# Patient Record
Sex: MALE | Race: BLACK OR AFRICAN AMERICAN | Employment: FULL TIME | URBAN - METROPOLITAN AREA
[De-identification: names, ages, dates, MRNs, and addresses within clinical notes are randomized per-mention and may not be internally consistent; named-entity substitution may affect disease eponyms.]

---

## 2018-05-01 ENCOUNTER — ED HISTORICAL/CONVERTED ENCOUNTER (OUTPATIENT)
Dept: OTHER | Age: 43
End: 2018-05-01

## 2018-12-12 ENCOUNTER — ED HISTORICAL/CONVERTED ENCOUNTER (OUTPATIENT)
Dept: OTHER | Age: 43
End: 2018-12-12

## 2019-03-23 ENCOUNTER — ED HISTORICAL/CONVERTED ENCOUNTER (OUTPATIENT)
Dept: OTHER | Age: 44
End: 2019-03-23

## 2019-07-09 ENCOUNTER — ED HISTORICAL/CONVERTED ENCOUNTER (OUTPATIENT)
Dept: OTHER | Age: 44
End: 2019-07-09

## 2020-02-10 ENCOUNTER — ED HISTORICAL/CONVERTED ENCOUNTER (OUTPATIENT)
Dept: OTHER | Age: 45
End: 2020-02-10

## 2020-12-23 ENCOUNTER — HOSPITAL ENCOUNTER (EMERGENCY)
Age: 45
Discharge: HOME | End: 2020-12-23
Attending: EMERGENCY MEDICINE
Payer: COMMERCIAL

## 2020-12-23 VITALS
OXYGEN SATURATION: 99 % | DIASTOLIC BLOOD PRESSURE: 92 MMHG | HEIGHT: 77 IN | SYSTOLIC BLOOD PRESSURE: 121 MMHG | WEIGHT: 280 LBS | HEART RATE: 82 BPM | BODY MASS INDEX: 33.06 KG/M2 | RESPIRATION RATE: 18 BRPM | TEMPERATURE: 98.3 F

## 2020-12-23 DIAGNOSIS — K52.9 GASTROENTERITIS: Primary | ICD-10-CM

## 2020-12-23 LAB
ALBUMIN SERPL-MCNC: 3.8 G/DL (ref 3.5–5)
ALBUMIN/GLOB SERPL: 0.9 {RATIO} (ref 1.1–2.2)
ALP SERPL-CCNC: 59 U/L (ref 45–117)
ALT SERPL-CCNC: 28 U/L (ref 12–78)
ANION GAP SERPL CALC-SCNC: 6 MMOL/L (ref 5–15)
AST SERPL W P-5'-P-CCNC: 12 U/L (ref 15–37)
BASOPHILS # BLD: 0 K/UL (ref 0–0.1)
BASOPHILS NFR BLD: 0 % (ref 0–1)
BILIRUB SERPL-MCNC: 0.4 MG/DL (ref 0.2–1)
BUN SERPL-MCNC: 12 MG/DL (ref 6–20)
BUN/CREAT SERPL: 10 (ref 12–20)
CA-I BLD-MCNC: 8.8 MG/DL (ref 8.5–10.1)
CHLORIDE SERPL-SCNC: 103 MMOL/L (ref 97–108)
CO2 SERPL-SCNC: 30 MMOL/L (ref 21–32)
CREAT SERPL-MCNC: 1.17 MG/DL (ref 0.7–1.3)
DIFFERENTIAL METHOD BLD: NORMAL
EOSINOPHIL # BLD: 0.3 K/UL (ref 0–0.4)
EOSINOPHIL NFR BLD: 5 % (ref 0–7)
ERYTHROCYTE [DISTWIDTH] IN BLOOD BY AUTOMATED COUNT: 13.2 % (ref 11.5–14.5)
GLOBULIN SER CALC-MCNC: 4.2 G/DL (ref 2–4)
GLUCOSE SERPL-MCNC: 106 MG/DL (ref 65–100)
HCT VFR BLD AUTO: 44.3 % (ref 36.6–50.3)
HGB BLD-MCNC: 14.5 G/DL (ref 12.1–17)
IMM GRANULOCYTES # BLD AUTO: 0 K/UL (ref 0–0.04)
IMM GRANULOCYTES NFR BLD AUTO: 0 % (ref 0–0.5)
LIPASE SERPL-CCNC: 89 U/L (ref 73–393)
LYMPHOCYTES # BLD: 2.4 K/UL (ref 0.8–3.5)
LYMPHOCYTES NFR BLD: 41 % (ref 12–49)
MCH RBC QN AUTO: 28.3 PG (ref 26–34)
MCHC RBC AUTO-ENTMCNC: 32.7 G/DL (ref 30–36.5)
MCV RBC AUTO: 86.5 FL (ref 80–99)
MONOCYTES # BLD: 0.4 K/UL (ref 0–1)
MONOCYTES NFR BLD: 8 % (ref 5–13)
NEUTS SEG # BLD: 2.7 K/UL (ref 1.8–8)
NEUTS SEG NFR BLD: 46 % (ref 32–75)
PLATELET # BLD AUTO: 291 K/UL (ref 150–400)
PMV BLD AUTO: 9.2 FL (ref 8.9–12.9)
POTASSIUM SERPL-SCNC: 4 MMOL/L (ref 3.5–5.1)
PROT SERPL-MCNC: 8 G/DL (ref 6.4–8.2)
RBC # BLD AUTO: 5.12 M/UL (ref 4.1–5.7)
SODIUM SERPL-SCNC: 139 MMOL/L (ref 136–145)
WBC # BLD AUTO: 5.8 K/UL (ref 4.1–11.1)

## 2020-12-23 PROCEDURE — 96374 THER/PROPH/DIAG INJ IV PUSH: CPT

## 2020-12-23 PROCEDURE — 99282 EMERGENCY DEPT VISIT SF MDM: CPT

## 2020-12-23 PROCEDURE — 36415 COLL VENOUS BLD VENIPUNCTURE: CPT

## 2020-12-23 PROCEDURE — 85025 COMPLETE CBC W/AUTO DIFF WBC: CPT

## 2020-12-23 PROCEDURE — 80053 COMPREHEN METABOLIC PANEL: CPT

## 2020-12-23 PROCEDURE — 74011250636 HC RX REV CODE- 250/636: Performed by: EMERGENCY MEDICINE

## 2020-12-23 PROCEDURE — 83690 ASSAY OF LIPASE: CPT

## 2020-12-23 RX ORDER — ONDANSETRON 2 MG/ML
4 INJECTION INTRAMUSCULAR; INTRAVENOUS
Status: COMPLETED | OUTPATIENT
Start: 2020-12-23 | End: 2020-12-23

## 2020-12-23 RX ORDER — ONDANSETRON 4 MG/1
4 TABLET, ORALLY DISINTEGRATING ORAL
Qty: 20 TAB | Refills: 0 | Status: SHIPPED | OUTPATIENT
Start: 2020-12-23

## 2020-12-23 RX ADMIN — ONDANSETRON 4 MG: 2 INJECTION INTRAMUSCULAR; INTRAVENOUS at 12:50

## 2020-12-23 NOTE — ED PROVIDER NOTES
EMERGENCY DEPARTMENT HISTORY AND PHYSICAL EXAM      Date: 12/23/2020  Patient Name: Lajuana Boeck      History of Presenting Illness     Chief Complaint   Patient presents with    Vomiting    Diarrhea       History Provided By: Patient    HPI: Lajuana Boeck, 39 y.o. male with a past medical history significant No significant past medical history presents to the ED with cc of NVD 1 month. Started after Thanskgivings    There are no other complaints, changes, or physical findings at this time. PCP: Aimee Hess DO        Past History     Past Medical History:  No past medical history on file. Past Surgical History:  Past Surgical History:   Procedure Laterality Date    HX OTHER SURGICAL      hernia        Family History:  No family history on file. Social History:  Social History     Tobacco Use    Smoking status: Never Smoker    Smokeless tobacco: Never Used   Substance Use Topics    Alcohol use: Yes     Comment: occasions    Drug use: No       Allergies: Allergies   Allergen Reactions    Amoxicillin Rash         Review of Systems     Review of Systems   Constitutional: Negative. HENT: Negative. Cardiovascular: Negative. Gastrointestinal: Positive for diarrhea, nausea and vomiting. Genitourinary: Negative. Musculoskeletal: Negative. Skin: Negative. Neurological: Negative. All other systems reviewed and are negative. Physical Exam     Physical Exam  Vitals signs and nursing note reviewed. Constitutional:       Appearance: Normal appearance. HENT:      Head: Normocephalic and atraumatic. Neck:      Musculoskeletal: Normal range of motion and neck supple. Cardiovascular:      Rate and Rhythm: Normal rate and regular rhythm. Pulses: Normal pulses. Heart sounds: Normal heart sounds. Pulmonary:      Effort: Pulmonary effort is normal.      Breath sounds: Normal breath sounds. Abdominal:      General: Abdomen is flat.       Palpations: Abdomen is soft. Musculoskeletal: Normal range of motion. Neurological:      General: No focal deficit present. Mental Status: He is alert and oriented to person, place, and time. Lab and Diagnostic Study Results     Labs -     Recent Results (from the past 12 hour(s))   CBC WITH AUTOMATED DIFF    Collection Time: 12/23/20 12:45 PM   Result Value Ref Range    WBC 5.8 4.1 - 11.1 K/uL    RBC 5.12 4.10 - 5.70 M/uL    HGB 14.5 12.1 - 17.0 g/dL    HCT 44.3 36.6 - 50.3 %    MCV 86.5 80.0 - 99.0 FL    MCH 28.3 26.0 - 34.0 PG    MCHC 32.7 30.0 - 36.5 g/dL    RDW 13.2 11.5 - 14.5 %    PLATELET 383 258 - 537 K/uL    MPV 9.2 8.9 - 12.9 FL    NEUTROPHILS 46 32 - 75 %    LYMPHOCYTES 41 12 - 49 %    MONOCYTES 8 5 - 13 %    EOSINOPHILS 5 0 - 7 %    BASOPHILS 0 0 - 1 %    IMMATURE GRANULOCYTES 0 0.0 - 0.5 %    ABS. NEUTROPHILS 2.7 1.8 - 8.0 K/UL    ABS. LYMPHOCYTES 2.4 0.8 - 3.5 K/UL    ABS. MONOCYTES 0.4 0.0 - 1.0 K/UL    ABS. EOSINOPHILS 0.3 0.0 - 0.4 K/UL    ABS. BASOPHILS 0.0 0.0 - 0.1 K/UL    ABS. IMM. GRANS. 0.0 0.00 - 0.04 K/UL    DF AUTOMATED     METABOLIC PANEL, COMPREHENSIVE    Collection Time: 12/23/20 12:45 PM   Result Value Ref Range    Sodium 139 136 - 145 mmol/L    Potassium 4.0 3.5 - 5.1 mmol/L    Chloride 103 97 - 108 mmol/L    CO2 30 21 - 32 mmol/L    Anion gap 6 5 - 15 mmol/L    Glucose 106 (H) 65 - 100 mg/dL    BUN 12 6 - 20 mg/dL    Creatinine 1.17 0.70 - 1.30 mg/dL    BUN/Creatinine ratio 10 (L) 12 - 20      GFR est AA >60 >60 ml/min/1.73m2    GFR est non-AA >60 >60 ml/min/1.73m2    Calcium 8.8 8.5 - 10.1 mg/dL    Bilirubin, total 0.4 0.2 - 1.0 mg/dL    AST (SGOT) 12 (L) 15 - 37 U/L    ALT (SGPT) 28 12 - 78 U/L    Alk.  phosphatase 59 45 - 117 U/L    Protein, total 8.0 6.4 - 8.2 g/dL    Albumin 3.8 3.5 - 5.0 g/dL    Globulin 4.2 (H) 2.0 - 4.0 g/dL    A-G Ratio 0.9 (L) 1.1 - 2.2     LIPASE    Collection Time: 12/23/20 12:45 PM   Result Value Ref Range    Lipase 89 73 - 393 U/L       Radiologic Studies - [unfilled]  CT Results  (Last 48 hours)    None        CXR Results  (Last 48 hours)    None          Medical Decision Making and ED Course   - I am the first and primary provider for this patient AND AM THE PRIMARY PROVIDER OF RECORD. - I reviewed the vital signs, available nursing notes, past medical history, past surgical history, family history and social history. - Initial assessment performed. The patients presenting problems have been discussed, and the staff are in agreement with the care plan formulated and outlined with them. I have encouraged them to ask questions as they arise throughout their visit. Vital Signs-Reviewed the patient's vital signs. Patient Vitals for the past 12 hrs:   Temp Pulse Resp BP SpO2   12/23/20 1249  82  (!) 121/92    12/23/20 1247  84  (!) 126/94    12/23/20 1245  89  127/84    12/23/20 1233 98.3 °F (36.8 °C) 90 18 (!) 134/93 99 %           Records Reviewed: Nursing Notes    The patient presents with     ED Course:       ED Course as of Dec 23 1406   Wed Dec 23, 2020   1252 BP(!): 121/92 [EO]      ED Course User Index  [EO] Tierra Paredes MD         Provider Notes (Medical Decision Making): MDM           Consultations:       Consultations:         Procedures and Critical Care       Performed by: Akosua Gr MD  PROCEDURES:  Procedures               C    Disposition     Disposition: DC- Adult Discharges: All of the diagnostic tests were reviewed and questions answered. Diagnosis, care plan and treatment options were discussed. The patient understands the instructions and will follow up as directed. The patients results have been reviewed with them. They have been counseled regarding their diagnosis. The patient verbally convey understanding and agreement of the signs, symptoms, diagnosis, treatment and prognosis and additionally agrees to follow up as recommended with their PCP in 24 - 48 hours.   They also agree with the care-plan and convey that all of their questions have been answered. I have also put together some discharge instructions for them that include: 1) educational information regarding their diagnosis, 2) how to care for their diagnosis at home, as well a 3) list of reasons why they would want to return to the ED prior to their follow-up appointment, should their condition change. Discharged    Remove if not discharged  DISCHARGE PLAN:  1. There are no discharge medications for this patient. 2.   Follow-up Information     Follow up With Specialties Details Why [facility], [address] [place] [address], DO Family Medicine In 3 days  [address]  [address] [place] [address]  [phone]          3. Return to ED if worse   4. Current Discharge Medication List      START taking these medications    Details   ondansetron (Zofran ODT) 4 mg disintegrating tablet 1 Tab by SubLINGual route every eight (8) hours as needed for Nausea or Vomiting. Qty: 20 Tab, Refills: 0             Diagnosis     Clinical Impression:   1. Gastroenteritis        Attestations:    Catalino Du MD    Please note that this dictation was completed with [employer], the computer voice recognition software. Quite often unanticipated grammatical, syntax, homophones, and other interpretive errors are inadvertently transcribed by the computer software. Please disregard these errors. Please excuse any errors that have escaped final proofreading. Thank you.

## 2020-12-23 NOTE — LETTER
[address] [facility] EMERGENCY DEPARTMENT  [address] [facility] [address]  [phone]    Work/School Note    Date: 12/23/2020    To Whom It May concern:      Lluvia Rios was seen and treated today in the emergency room by the following provider(s):  Attending Provider: Catalino Hodgson MD.      Lluvia Rios is excused from work/school on 12/23/20. He is clear to return to work/school on 12/24/20.         Sincerely,          Yulissa Jordan

## 2023-05-16 RX ORDER — ONDANSETRON 4 MG/1
TABLET, ORALLY DISINTEGRATING ORAL EVERY 8 HOURS PRN
COMMUNITY
Start: 2020-12-23

## 2023-08-10 ENCOUNTER — HOSPITAL ENCOUNTER (EMERGENCY)
Facility: HOSPITAL | Age: 48
Discharge: HOME | End: 2023-08-11
Attending: STUDENT IN AN ORGANIZED HEALTH CARE EDUCATION/TRAINING PROGRAM
Payer: COMMERCIAL

## 2023-08-10 DIAGNOSIS — I10 PRIMARY HYPERTENSION: Primary | ICD-10-CM

## 2023-08-10 DIAGNOSIS — R51.9 NONINTRACTABLE HEADACHE, UNSPECIFIED CHRONICITY PATTERN, UNSPECIFIED HEADACHE TYPE: ICD-10-CM

## 2023-08-10 PROCEDURE — 93005 ELECTROCARDIOGRAM TRACING: CPT | Performed by: STUDENT IN AN ORGANIZED HEALTH CARE EDUCATION/TRAINING PROGRAM

## 2023-08-10 PROCEDURE — 99284 EMERGENCY DEPT VISIT MOD MDM: CPT

## 2023-08-11 ENCOUNTER — APPOINTMENT (OUTPATIENT)
Facility: HOSPITAL | Age: 48
End: 2023-08-11
Payer: COMMERCIAL

## 2023-08-11 VITALS
BODY MASS INDEX: 34.24 KG/M2 | OXYGEN SATURATION: 99 % | RESPIRATION RATE: 18 BRPM | WEIGHT: 290 LBS | DIASTOLIC BLOOD PRESSURE: 96 MMHG | TEMPERATURE: 99 F | HEIGHT: 77 IN | HEART RATE: 68 BPM | SYSTOLIC BLOOD PRESSURE: 159 MMHG

## 2023-08-11 LAB
EKG ATRIAL RATE: 76 BPM
EKG DIAGNOSIS: NORMAL
EKG P AXIS: 51 DEGREES
EKG P-R INTERVAL: 148 MS
EKG Q-T INTERVAL: 368 MS
EKG QRS DURATION: 94 MS
EKG QTC CALCULATION (BAZETT): 414 MS
EKG R AXIS: -1 DEGREES
EKG T AXIS: 45 DEGREES
EKG VENTRICULAR RATE: 76 BPM

## 2023-08-11 PROCEDURE — 6370000000 HC RX 637 (ALT 250 FOR IP): Performed by: STUDENT IN AN ORGANIZED HEALTH CARE EDUCATION/TRAINING PROGRAM

## 2023-08-11 PROCEDURE — 70450 CT HEAD/BRAIN W/O DYE: CPT

## 2023-08-11 RX ORDER — IBUPROFEN 600 MG/1
600 TABLET ORAL
Status: DISCONTINUED | OUTPATIENT
Start: 2023-08-11 | End: 2023-08-11

## 2023-08-11 RX ORDER — IBUPROFEN 800 MG/1
800 TABLET ORAL
Status: COMPLETED | OUTPATIENT
Start: 2023-08-11 | End: 2023-08-11

## 2023-08-11 RX ADMIN — IBUPROFEN 800 MG: 800 TABLET, FILM COATED ORAL at 01:10

## 2023-08-11 NOTE — ED PROVIDER NOTES
[facility] EMERGENCY DEPT  EMERGENCY DEPARTMENT HISTORY AND PHYSICAL EXAM      Date: 8/10/2023  Patient Name: Ronit Gomez  MRN: [id number]  YOB: 1975  Date of evaluation: 8/10/2023  Provider: Maryuri Cabral MD   Note Started: 12:54 AM EDT 8/11/23    HISTORY OF PRESENT ILLNESS     Chief Complaint   Patient presents with    Hypertension    Headache    Shortness of Breath       History Provided By: Patient    HPI: Ronit Gomez is a 52 y.o. male presents to the emergency department for headache concerned about high blood pressure. Patient states that over the last several days he has noticed his blood pressure to be moderately elevated, today noticed a headache which has slowly increased in severity described as aching right-sided pain. Patient states he took his lisinopril this morning. Patient admits to being minimally compliant with his medications, did check his blood pressure at home said it was 240/120. Denies any neck pain or stiffness denies any blurry vision double vision, no nausea vomiting. PAST MEDICAL HISTORY   Past Medical History:  Past Medical History:   Diagnosis Date    Diabetes mellitus ([address])     Hypertension        Past Surgical History:  Past Surgical History:   Procedure Laterality Date    OTHER SURGICAL HISTORY      hernia        Family History:  History reviewed. No pertinent family history. Social History:  Social History     Tobacco Use    Smoking status: Never    Smokeless tobacco: Never   Substance Use Topics    Alcohol use: Yes    Drug use: No       Allergies:   Allergies   Allergen Reactions    Amoxicillin Rash       PCP: Skylar Rice DO    Current Meds:   Current Facility-Administered Medications   Medication Dose Route Frequency Provider Last Rate Last Admin    ibuprofen (ADVIL;MOTRIN) tablet 800 mg  800 mg Oral NOW Darlene Parker MD         Current Outpatient Medications   Medication Sig Dispense Refill    ondansetron (ZOFRAN-ODT) 4 MG disintegrating tablet Place

## 2023-09-07 ENCOUNTER — HOSPITAL ENCOUNTER (EMERGENCY)
Facility: HOSPITAL | Age: 48
Discharge: HOME | End: 2023-09-07
Attending: STUDENT IN AN ORGANIZED HEALTH CARE EDUCATION/TRAINING PROGRAM
Payer: COMMERCIAL

## 2023-09-07 VITALS
OXYGEN SATURATION: 96 % | TEMPERATURE: 98 F | HEIGHT: 77 IN | BODY MASS INDEX: 34.95 KG/M2 | RESPIRATION RATE: 14 BRPM | HEART RATE: 88 BPM | SYSTOLIC BLOOD PRESSURE: 119 MMHG | WEIGHT: 296 LBS | DIASTOLIC BLOOD PRESSURE: 84 MMHG

## 2023-09-07 DIAGNOSIS — R79.89 ELEVATED SERUM CREATININE: ICD-10-CM

## 2023-09-07 DIAGNOSIS — E86.0 DEHYDRATION: ICD-10-CM

## 2023-09-07 DIAGNOSIS — R42 LIGHTHEADEDNESS: Primary | ICD-10-CM

## 2023-09-07 LAB
ALBUMIN SERPL-MCNC: 4.4 G/DL (ref 3.5–5)
ALBUMIN/GLOB SERPL: 1.2 (ref 1.1–2.2)
ALP SERPL-CCNC: 36 U/L (ref 45–117)
ALT SERPL-CCNC: 25 U/L (ref 12–78)
ANION GAP SERPL CALC-SCNC: 10 MMOL/L (ref 5–15)
AST SERPL W P-5'-P-CCNC: 9 U/L (ref 15–37)
BASOPHILS # BLD: 0 K/UL (ref 0–0.1)
BASOPHILS NFR BLD: 0 % (ref 0–1)
BILIRUB SERPL-MCNC: 0.4 MG/DL (ref 0.2–1)
BNP SERPL-MCNC: 5 PG/ML
BUN SERPL-MCNC: 36 MG/DL (ref 6–20)
BUN/CREAT SERPL: 15 (ref 12–20)
CA-I BLD-MCNC: 9.3 MG/DL (ref 8.5–10.1)
CHLORIDE SERPL-SCNC: 104 MMOL/L (ref 97–108)
CK SERPL-CCNC: 108 U/L (ref 39–308)
CO2 SERPL-SCNC: 24 MMOL/L (ref 21–32)
CREAT SERPL-MCNC: 2.44 MG/DL (ref 0.7–1.3)
DIFFERENTIAL METHOD BLD: NORMAL
EOSINOPHIL # BLD: 0.1 K/UL (ref 0–0.4)
EOSINOPHIL NFR BLD: 2 % (ref 0–7)
ERYTHROCYTE [DISTWIDTH] IN BLOOD BY AUTOMATED COUNT: 13.7 % (ref 11.5–14.5)
GLOBULIN SER CALC-MCNC: 3.8 G/DL (ref 2–4)
GLUCOSE SERPL-MCNC: 150 MG/DL (ref 65–100)
HCT VFR BLD AUTO: 44.7 % (ref 36.6–50.3)
HGB BLD-MCNC: 15 G/DL (ref 12.1–17)
IMM GRANULOCYTES # BLD AUTO: 0 K/UL (ref 0–0.04)
IMM GRANULOCYTES NFR BLD AUTO: 0 % (ref 0–0.5)
LYMPHOCYTES # BLD: 2.3 K/UL (ref 0.8–3.5)
LYMPHOCYTES NFR BLD: 34 % (ref 12–49)
MCH RBC QN AUTO: 28.1 PG (ref 26–34)
MCHC RBC AUTO-ENTMCNC: 33.6 G/DL (ref 30–36.5)
MCV RBC AUTO: 83.9 FL (ref 80–99)
MONOCYTES # BLD: 0.5 K/UL (ref 0–1)
MONOCYTES NFR BLD: 7 % (ref 5–13)
NEUTS SEG # BLD: 3.9 K/UL (ref 1.8–8)
NEUTS SEG NFR BLD: 57 % (ref 32–75)
NRBC # BLD: 0 K/UL (ref 0–0.01)
NRBC BLD-RTO: 0 PER 100 WBC
PLATELET # BLD AUTO: 280 K/UL (ref 150–400)
PMV BLD AUTO: 9.2 FL (ref 8.9–12.9)
POTASSIUM SERPL-SCNC: 4 MMOL/L (ref 3.5–5.1)
PROT SERPL-MCNC: 8.2 G/DL (ref 6.4–8.2)
RBC # BLD AUTO: 5.33 M/UL (ref 4.1–5.7)
SODIUM SERPL-SCNC: 138 MMOL/L (ref 136–145)
TROPONIN I SERPL HS-MCNC: 4 NG/L (ref 0–76)
WBC # BLD AUTO: 6.8 K/UL (ref 4.1–11.1)

## 2023-09-07 PROCEDURE — 83880 ASSAY OF NATRIURETIC PEPTIDE: CPT

## 2023-09-07 PROCEDURE — 84484 ASSAY OF TROPONIN QUANT: CPT

## 2023-09-07 PROCEDURE — 96360 HYDRATION IV INFUSION INIT: CPT

## 2023-09-07 PROCEDURE — 96361 HYDRATE IV INFUSION ADD-ON: CPT

## 2023-09-07 PROCEDURE — 2580000003 HC RX 258: Performed by: STUDENT IN AN ORGANIZED HEALTH CARE EDUCATION/TRAINING PROGRAM

## 2023-09-07 PROCEDURE — 2580000003 HC RX 258: Performed by: EMERGENCY MEDICINE

## 2023-09-07 PROCEDURE — 36415 COLL VENOUS BLD VENIPUNCTURE: CPT

## 2023-09-07 PROCEDURE — 80053 COMPREHEN METABOLIC PANEL: CPT

## 2023-09-07 PROCEDURE — 93005 ELECTROCARDIOGRAM TRACING: CPT | Performed by: EMERGENCY MEDICINE

## 2023-09-07 PROCEDURE — 99284 EMERGENCY DEPT VISIT MOD MDM: CPT

## 2023-09-07 PROCEDURE — 85025 COMPLETE CBC W/AUTO DIFF WBC: CPT

## 2023-09-07 PROCEDURE — 82550 ASSAY OF CK (CPK): CPT

## 2023-09-07 RX ORDER — 0.9 % SODIUM CHLORIDE 0.9 %
1000 INTRAVENOUS SOLUTION INTRAVENOUS ONCE
Status: COMPLETED | OUTPATIENT
Start: 2023-09-07 | End: 2023-09-07

## 2023-09-07 RX ADMIN — SODIUM CHLORIDE 1000 ML: 9 INJECTION, SOLUTION INTRAVENOUS at 14:08

## 2023-09-07 RX ADMIN — SODIUM CHLORIDE 1000 ML: 9 INJECTION, SOLUTION INTRAVENOUS at 15:23

## 2023-09-07 ASSESSMENT — LIFESTYLE VARIABLES
HOW MANY STANDARD DRINKS CONTAINING ALCOHOL DO YOU HAVE ON A TYPICAL DAY: PATIENT DOES NOT DRINK
HOW OFTEN DO YOU HAVE A DRINK CONTAINING ALCOHOL: NEVER

## 2023-09-07 ASSESSMENT — PAIN - FUNCTIONAL ASSESSMENT: PAIN_FUNCTIONAL_ASSESSMENT: NONE - DENIES PAIN

## 2023-09-07 NOTE — ED PROVIDER NOTES
in the setting of his dehydration, but that it needs to be rechecked regardless. Strict return precautions discussed and advised him to follow-up with his PCP today or tomorrow. He agrees with plan. Safe for discharge. [BD]      ED Course User Index  [BD] Nelly Bailey MD       Disposition Considerations (Tests not done, Shared Decision Making, Pt Expectation of Test or Treatment.): See ED Course    Patient was given the following medications:  Medications   sodium chloride 0.9 % bolus 1,000 mL (has no administration in time range)       CONSULTS: (Who and What was discussed)  None     Social Determinants affecting Dx or Tx: None    Smoking Cessation: Not Applicable    PROCEDURES   Unless otherwise noted above, none  Procedures      CRITICAL CARE TIME   CRITICAL CARE NOTE :    11:57 PM    IMPENDING DETERIORATION -Cardiovascular  ASSOCIATED RISK FACTORS - Hypotension and Dehydration  MANAGEMENT- Bedside Assessment and Supervision of Care  INTERPRETATION -  ECG and Blood Pressure  INTERVENTIONS - hemodynamic mgmt  CASE REVIEW - Family  TREATMENT RESPONSE -Improved  PERFORMED BY - Self    NOTES   :  I have spent 25 minutes of critical care time involved in lab review, consultations with specialist, family decision- making, bedside attention and documentation. This time excludes time spent in any separate billed procedures. During this entire length of time I was immediately available to the patient . Nelly Bailey MD    ED FINAL IMPRESSION   No diagnosis found. DISPOSITION/PLAN   DISPOSITION      Discharge Note: The patient is stable for discharge home. The signs, symptoms, diagnosis, and discharge instructions have been discussed, understanding conveyed, and agreed upon. The patient is to follow up as recommended or return to ER should their symptoms worsen. PATIENT REFERRED TO:  No follow-up provider specified.       DISCHARGE MEDICATIONS:     Medication List        ASK your doctor about

## 2023-09-07 NOTE — DISCHARGE INSTRUCTIONS
We strongly advise you to follow-up with your primary care provider and discuss your elevated creatinine with them. Thank you! Thank you for allowing me to care for you in the emergency department. It is my goal to provide you with excellent care. If you have not received excellent quality care, please ask to speak to the nurse manager. Please fill out the survey that will come to you by mail or email since we listen to your feedback! Below you will find a list of your tests from today's visit. Should you have any questions, please do not hesitate to call the emergency department.     Labs  Recent Results (from the past 12 hour(s))   CBC with Auto Differential    Collection Time: 09/07/23  2:03 PM   Result Value Ref Range    WBC 6.8 4.1 - 11.1 K/uL    RBC 5.33 4.10 - 5.70 M/uL    Hemoglobin 15.0 12.1 - 17.0 g/dL    Hematocrit 44.7 36.6 - 50.3 %    MCV 83.9 80.0 - 99.0 FL    MCH 28.1 26.0 - 34.0 PG    MCHC 33.6 30.0 - 36.5 g/dL    RDW 13.7 11.5 - 14.5 %    Platelets 441 446 - 999 K/uL    MPV 9.2 8.9 - 12.9 FL    Nucleated RBCs 0.0 0.0  WBC    nRBC 0.00 0.00 - 0.01 K/uL    Neutrophils % 57 32 - 75 %    Lymphocytes % 34 12 - 49 %    Monocytes % 7 5 - 13 %    Eosinophils % 2 0 - 7 %    Basophils % 0 0 - 1 %    Immature Granulocytes 0 0 - 0.5 %    Neutrophils Absolute 3.9 1.8 - 8.0 K/UL    Lymphocytes Absolute 2.3 0.8 - 3.5 K/UL    Monocytes Absolute 0.5 0.0 - 1.0 K/UL    Eosinophils Absolute 0.1 0.0 - 0.4 K/UL    Basophils Absolute 0.0 0.0 - 0.1 K/UL    Absolute Immature Granulocyte 0.0 0.00 - 0.04 K/UL    Differential Type AUTOMATED     Comprehensive Metabolic Panel    Collection Time: 09/07/23  2:03 PM   Result Value Ref Range    Sodium 138 136 - 145 mmol/L    Potassium 4.0 3.5 - 5.1 mmol/L    Chloride 104 97 - 108 mmol/L    CO2 24 21 - 32 mmol/L    Anion Gap 10 5 - 15 mmol/L    Glucose 150 (H) 65 - 100 mg/dL    BUN 36 (H) 6 - 20 mg/dL    Creatinine 2.44 (H) 0.70 - 1.30 mg/dL    Bun/Cre Ratio

## 2023-09-08 LAB
EKG ATRIAL RATE: 79 BPM
EKG DIAGNOSIS: NORMAL
EKG P AXIS: 49 DEGREES
EKG P-R INTERVAL: 142 MS
EKG Q-T INTERVAL: 372 MS
EKG QRS DURATION: 96 MS
EKG QTC CALCULATION (BAZETT): 426 MS
EKG R AXIS: 31 DEGREES
EKG T AXIS: 16 DEGREES
EKG VENTRICULAR RATE: 79 BPM

## 2023-09-20 ENCOUNTER — HOSPITAL ENCOUNTER (EMERGENCY)
Facility: HOSPITAL | Age: 48
Discharge: HOME | End: 2023-09-20
Payer: COMMERCIAL

## 2023-09-20 VITALS
OXYGEN SATURATION: 96 % | DIASTOLIC BLOOD PRESSURE: 98 MMHG | BODY MASS INDEX: 33.06 KG/M2 | RESPIRATION RATE: 18 BRPM | SYSTOLIC BLOOD PRESSURE: 152 MMHG | HEART RATE: 69 BPM | TEMPERATURE: 98.5 F | WEIGHT: 280 LBS | HEIGHT: 77 IN

## 2023-09-20 DIAGNOSIS — M54.6 ACUTE BILATERAL THORACIC BACK PAIN: Primary | ICD-10-CM

## 2023-09-20 LAB
ALBUMIN SERPL-MCNC: 3.9 G/DL (ref 3.5–5)
ALBUMIN/GLOB SERPL: 1.1 (ref 1.1–2.2)
ALP SERPL-CCNC: 46 U/L (ref 45–117)
ALT SERPL-CCNC: 29 U/L (ref 12–78)
ANION GAP SERPL CALC-SCNC: 10 MMOL/L (ref 5–15)
APPEARANCE UR: CLEAR
AST SERPL W P-5'-P-CCNC: 22 U/L (ref 15–37)
BACTERIA URNS QL MICRO: NEGATIVE /HPF
BASOPHILS # BLD: 0 K/UL (ref 0–0.1)
BASOPHILS NFR BLD: 0 % (ref 0–1)
BILIRUB SERPL-MCNC: 0.3 MG/DL (ref 0.2–1)
BILIRUB UR QL: NEGATIVE
BUN SERPL-MCNC: 16 MG/DL (ref 6–20)
BUN/CREAT SERPL: 16 (ref 12–20)
CA-I BLD-MCNC: 9.2 MG/DL (ref 8.5–10.1)
CHLORIDE SERPL-SCNC: 106 MMOL/L (ref 97–108)
CO2 SERPL-SCNC: 25 MMOL/L (ref 21–32)
COLOR UR: ABNORMAL
CREAT SERPL-MCNC: 1.02 MG/DL (ref 0.7–1.3)
DIFFERENTIAL METHOD BLD: NORMAL
EOSINOPHIL # BLD: 0.1 K/UL (ref 0–0.4)
EOSINOPHIL NFR BLD: 2 % (ref 0–7)
EPITH CASTS URNS QL MICRO: ABNORMAL /LPF
ERYTHROCYTE [DISTWIDTH] IN BLOOD BY AUTOMATED COUNT: 14.3 % (ref 11.5–14.5)
GLOBULIN SER CALC-MCNC: 3.5 G/DL (ref 2–4)
GLUCOSE SERPL-MCNC: 127 MG/DL (ref 65–100)
GLUCOSE UR STRIP.AUTO-MCNC: 150 MG/DL
HCT VFR BLD AUTO: 39.2 % (ref 36.6–50.3)
HGB BLD-MCNC: 12.8 G/DL (ref 12.1–17)
HGB UR QL STRIP: NEGATIVE
IMM GRANULOCYTES # BLD AUTO: 0 K/UL (ref 0–0.04)
IMM GRANULOCYTES NFR BLD AUTO: 0 % (ref 0–0.5)
KETONES UR QL STRIP.AUTO: NEGATIVE MG/DL
LEUKOCYTE ESTERASE UR QL STRIP.AUTO: NEGATIVE
LYMPHOCYTES # BLD: 2.9 K/UL (ref 0.8–3.5)
LYMPHOCYTES NFR BLD: 43 % (ref 12–49)
MCH RBC QN AUTO: 28.2 PG (ref 26–34)
MCHC RBC AUTO-ENTMCNC: 32.7 G/DL (ref 30–36.5)
MCV RBC AUTO: 86.3 FL (ref 80–99)
MONOCYTES # BLD: 0.5 K/UL (ref 0–1)
MONOCYTES NFR BLD: 7 % (ref 5–13)
MUCOUS THREADS URNS QL MICRO: ABNORMAL /LPF
NEUTS SEG # BLD: 3.1 K/UL (ref 1.8–8)
NEUTS SEG NFR BLD: 48 % (ref 32–75)
NITRITE UR QL STRIP.AUTO: NEGATIVE
NRBC # BLD: 0 K/UL (ref 0–0.01)
NRBC BLD-RTO: 0 PER 100 WBC
PH UR STRIP: 5 (ref 5–8)
PLATELET # BLD AUTO: 242 K/UL (ref 150–400)
PMV BLD AUTO: 9.7 FL (ref 8.9–12.9)
POTASSIUM SERPL-SCNC: 4.9 MMOL/L (ref 3.5–5.1)
PROT SERPL-MCNC: 7.4 G/DL (ref 6.4–8.2)
PROT UR STRIP-MCNC: NEGATIVE MG/DL
RBC # BLD AUTO: 4.54 M/UL (ref 4.1–5.7)
RBC #/AREA URNS HPF: ABNORMAL /HPF (ref 0–5)
SODIUM SERPL-SCNC: 141 MMOL/L (ref 136–145)
SP GR UR REFRACTOMETRY: 1.02 (ref 1–1.03)
URINE CULTURE IF INDICATED: ABNORMAL
UROBILINOGEN UR QL STRIP.AUTO: 0.1 EU/DL (ref 0.1–1)
WBC # BLD AUTO: 6.7 K/UL (ref 4.1–11.1)
WBC URNS QL MICRO: ABNORMAL /HPF (ref 0–4)

## 2023-09-20 PROCEDURE — 6360000002 HC RX W HCPCS

## 2023-09-20 PROCEDURE — 36415 COLL VENOUS BLD VENIPUNCTURE: CPT

## 2023-09-20 PROCEDURE — 99284 EMERGENCY DEPT VISIT MOD MDM: CPT

## 2023-09-20 PROCEDURE — 81001 URINALYSIS AUTO W/SCOPE: CPT

## 2023-09-20 PROCEDURE — 96374 THER/PROPH/DIAG INJ IV PUSH: CPT

## 2023-09-20 PROCEDURE — 85025 COMPLETE CBC W/AUTO DIFF WBC: CPT

## 2023-09-20 PROCEDURE — 80053 COMPREHEN METABOLIC PANEL: CPT

## 2023-09-20 RX ORDER — KETOROLAC TROMETHAMINE 10 MG/1
10 TABLET, FILM COATED ORAL EVERY 6 HOURS PRN
Qty: 12 TABLET | Refills: 0 | Status: SHIPPED | OUTPATIENT
Start: 2023-09-20

## 2023-09-20 RX ORDER — KETOROLAC TROMETHAMINE 15 MG/ML
15 INJECTION, SOLUTION INTRAMUSCULAR; INTRAVENOUS
Status: COMPLETED | OUTPATIENT
Start: 2023-09-20 | End: 2023-09-20

## 2023-09-20 RX ORDER — METHOCARBAMOL 500 MG/1
500 TABLET, FILM COATED ORAL 4 TIMES DAILY
Qty: 12 TABLET | Refills: 0 | Status: SHIPPED | OUTPATIENT
Start: 2023-09-20 | End: 2023-09-23

## 2023-09-20 RX ADMIN — KETOROLAC TROMETHAMINE 15 MG: 15 INJECTION, SOLUTION INTRAMUSCULAR; INTRAVENOUS at 20:02

## 2023-09-20 ASSESSMENT — PAIN DESCRIPTION - ORIENTATION: ORIENTATION: RIGHT;LEFT

## 2023-09-20 ASSESSMENT — PAIN DESCRIPTION - LOCATION: LOCATION: FLANK

## 2023-09-20 ASSESSMENT — PAIN SCALES - GENERAL
PAINLEVEL_OUTOF10: 9
PAINLEVEL_OUTOF10: 9

## 2023-09-20 ASSESSMENT — PAIN - FUNCTIONAL ASSESSMENT
PAIN_FUNCTIONAL_ASSESSMENT: 0-10
PAIN_FUNCTIONAL_ASSESSMENT: 0-10

## 2023-09-20 NOTE — ED TRIAGE NOTES
Pt complaining of flank pain that started two days ago.  Pt states his urine is more foamy than normal. Patient states no difficulty urination or pain when urinating

## 2023-09-20 NOTE — ED PROVIDER NOTES
[facility] EMERGENCY DEPT  EMERGENCY DEPARTMENT HISTORY AND PHYSICAL EXAM      Date: 9/20/2023  Patient Name: Maday Reynolds  MRN: [id number]  YOB: 1975  Date of evaluation: 9/20/2023  Provider: LAURA Islas NP   Note Started: 6:42 PM EDT 9/20/23    HISTORY OF PRESENT ILLNESS     Chief Complaint   Patient presents with    Flank Pain       History Provided By: Patient    HPI: Maday Reynolds is a 50 y.o. male with past medical history of prediabetes and hypertension, presents ambulatory to the emergency department complaints of bilateral flank pain, urinary frequency, and foamy urine for 2 days. Patient states he has not had pain like this before and has not had any recent back injuries. Denies recent illness, fever/chills, abdominal pain, nausea/vomiting, dysuria, hematuria, diarrhea, rash, penile discharge. Upon arrival to the emergency department patient is alert and oriented x3, well-appearing, interacting appropriately, no obvious signs of distress noted. PAST MEDICAL HISTORY   Past Medical History:  Past Medical History:   Diagnosis Date    Diabetes mellitus ([address])     Hypertension        Past Surgical History:  Past Surgical History:   Procedure Laterality Date    OTHER SURGICAL HISTORY      hernia        Family History:  No family history on file. Social History:  Social History     Tobacco Use    Smoking status: Never    Smokeless tobacco: Never   Substance Use Topics    Alcohol use: Yes    Drug use: No       Allergies: Allergies   Allergen Reactions    Amoxicillin Rash       PCP: Dick Walden DO    Current Meds:   No current facility-administered medications for this encounter.      Current Outpatient Medications   Medication Sig Dispense Refill    ondansetron (ZOFRAN-ODT) 4 MG disintegrating tablet Place under the tongue every 8 hours as needed         Social Determinants of Health:   Social Determinants of Health     Tobacco Use: Low Risk  (8/10/2023)    Patient History     Smoking

## 2023-09-21 NOTE — DISCHARGE INSTRUCTIONS
29 12 - 78 U/L    Alk Phosphatase 46 45 - 117 U/L    Total Protein 7.4 6.4 - 8.2 g/dL    Albumin 3.9 3.5 - 5.0 g/dL    Globulin 3.5 2.0 - 4.0 g/dL    Albumin/Globulin Ratio 1.1 1.1 - 2.2     Urinalysis with Reflex to Culture    Collection Time: 09/20/23  8:01 PM    Specimen: Urine   Result Value Ref Range    Color, UA Yellow/Straw      Appearance Clear Clear      Specific Gravity, UA 1.019 1.003 - 1.030      pH, Urine 5.0 5.0 - 8.0      Protein, UA Negative Negative mg/dL    Glucose,  (A) Negative mg/dL    Ketones, Urine Negative Negative mg/dL    Bilirubin Urine Negative Negative      Blood, Urine Negative Negative      Urobilinogen, Urine 0.1 0.1 - 1.0 EU/dL    Nitrite, Urine Negative Negative      Leukocyte Esterase, Urine Negative Negative      Urine Culture if Indicated Culture not indicated by UA result Culture not indicated by UA result      WBC, UA 0-4 0 - 4 /hpf    RBC, UA 0-5 0 - 5 /hpf    Epithelial Cells UA Few Few /lpf    BACTERIA, URINE Negative Negative /hpf    Mucus, UA Trace /lpf       Radiologic Studies  No orders to display     ------------------------------------------------------------------------------------------------------------  The exam and treatment you received in the Emergency Department were for an urgent problem and are not intended as complete care. It is important that you follow-up with a doctor, nurse practitioner, or physician assistant to:  (1) confirm your diagnosis,  (2) re-evaluation of changes in your illness and treatment, and  (3) for ongoing care. Please take your discharge instructions with you when you go to your follow-up appointment. If you have any problem arranging a follow-up appointment, contact the Emergency Department. If your symptoms become worse or you do not improve as expected and you are unable to reach your health care provider, please return to the Emergency Department. We are available 24 hours a day.      If a prescription has been provided,

## 2023-09-23 ENCOUNTER — HOSPITAL ENCOUNTER (EMERGENCY)
Facility: HOSPITAL | Age: 48
Discharge: HOME | End: 2023-09-24
Attending: STUDENT IN AN ORGANIZED HEALTH CARE EDUCATION/TRAINING PROGRAM
Payer: COMMERCIAL

## 2023-09-23 ENCOUNTER — APPOINTMENT (OUTPATIENT)
Facility: HOSPITAL | Age: 48
End: 2023-09-23
Payer: COMMERCIAL

## 2023-09-23 DIAGNOSIS — T78.40XA ALLERGIC REACTION, INITIAL ENCOUNTER: Primary | ICD-10-CM

## 2023-09-23 DIAGNOSIS — I10 ESSENTIAL HYPERTENSION: ICD-10-CM

## 2023-09-23 DIAGNOSIS — R07.9 CHEST PAIN, UNSPECIFIED TYPE: ICD-10-CM

## 2023-09-23 LAB
ANION GAP SERPL CALC-SCNC: 2 MMOL/L (ref 5–15)
BASOPHILS # BLD: 0 K/UL (ref 0–0.1)
BASOPHILS NFR BLD: 0 % (ref 0–1)
BUN SERPL-MCNC: 16 MG/DL (ref 6–20)
BUN/CREAT SERPL: 16 (ref 12–20)
CA-I BLD-MCNC: 8.6 MG/DL (ref 8.5–10.1)
CHLORIDE SERPL-SCNC: 103 MMOL/L (ref 97–108)
CO2 SERPL-SCNC: 23 MMOL/L (ref 21–32)
CREAT SERPL-MCNC: 1.03 MG/DL (ref 0.7–1.3)
DIFFERENTIAL METHOD BLD: ABNORMAL
EOSINOPHIL # BLD: 0.1 K/UL (ref 0–0.4)
EOSINOPHIL NFR BLD: 2 % (ref 0–7)
ERYTHROCYTE [DISTWIDTH] IN BLOOD BY AUTOMATED COUNT: 16.7 % (ref 11.5–14.5)
GLUCOSE SERPL-MCNC: 118 MG/DL (ref 65–100)
HCT VFR BLD AUTO: 43.6 % (ref 36.6–50.3)
HGB BLD-MCNC: 14.4 G/DL (ref 12.1–17)
IMM GRANULOCYTES # BLD AUTO: 0 K/UL (ref 0–0.04)
IMM GRANULOCYTES NFR BLD AUTO: 0 % (ref 0–0.5)
LYMPHOCYTES # BLD: 2.3 K/UL (ref 0.8–3.5)
LYMPHOCYTES NFR BLD: 42 % (ref 12–49)
MAGNESIUM SERPL-MCNC: 1.9 MG/DL (ref 1.6–2.4)
MAGNESIUM SERPL-MCNC: NORMAL MG/DL (ref 1.6–2.4)
MAGNESIUM SERPL-MCNC: NORMAL MG/DL (ref 1.6–2.4)
MCH RBC QN AUTO: 27.7 PG (ref 26–34)
MCHC RBC AUTO-ENTMCNC: 33 G/DL (ref 30–36.5)
MCV RBC AUTO: 83.8 FL (ref 80–99)
MONOCYTES # BLD: 0.3 K/UL (ref 0–1)
MONOCYTES NFR BLD: 6 % (ref 5–13)
NEUTS SEG # BLD: 2.7 K/UL (ref 1.8–8)
NEUTS SEG NFR BLD: 50 % (ref 32–75)
NRBC # BLD: 0 K/UL (ref 0–0.01)
NRBC BLD-RTO: 0 PER 100 WBC
PLATELET # BLD AUTO: 275 K/UL (ref 150–400)
PMV BLD AUTO: 12 FL (ref 8.9–12.9)
POTASSIUM SERPL-SCNC: 4.1 MMOL/L (ref 3.5–5.1)
POTASSIUM SERPL-SCNC: ABNORMAL MMOL/L (ref 3.5–5.1)
POTASSIUM SERPL-SCNC: NORMAL MMOL/L (ref 3.5–5.1)
RBC # BLD AUTO: 5.2 M/UL (ref 4.1–5.7)
SODIUM SERPL-SCNC: 128 MMOL/L (ref 136–145)
TROPONIN I SERPL HS-MCNC: 14 NG/L (ref 0–76)
TROPONIN I SERPL HS-MCNC: 19 NG/L (ref 0–76)
TROPONIN I SERPL HS-MCNC: NORMAL NG/L (ref 0–76)
WBC # BLD AUTO: 5.5 K/UL (ref 4.1–11.1)

## 2023-09-23 PROCEDURE — 83735 ASSAY OF MAGNESIUM: CPT

## 2023-09-23 PROCEDURE — 71046 X-RAY EXAM CHEST 2 VIEWS: CPT

## 2023-09-23 PROCEDURE — 2500000003 HC RX 250 WO HCPCS: Performed by: STUDENT IN AN ORGANIZED HEALTH CARE EDUCATION/TRAINING PROGRAM

## 2023-09-23 PROCEDURE — 85025 COMPLETE CBC W/AUTO DIFF WBC: CPT

## 2023-09-23 PROCEDURE — 6360000002 HC RX W HCPCS: Performed by: STUDENT IN AN ORGANIZED HEALTH CARE EDUCATION/TRAINING PROGRAM

## 2023-09-23 PROCEDURE — 99285 EMERGENCY DEPT VISIT HI MDM: CPT

## 2023-09-23 PROCEDURE — 36415 COLL VENOUS BLD VENIPUNCTURE: CPT

## 2023-09-23 PROCEDURE — 96375 TX/PRO/DX INJ NEW DRUG ADDON: CPT

## 2023-09-23 PROCEDURE — 2580000003 HC RX 258: Performed by: STUDENT IN AN ORGANIZED HEALTH CARE EDUCATION/TRAINING PROGRAM

## 2023-09-23 PROCEDURE — 93005 ELECTROCARDIOGRAM TRACING: CPT | Performed by: STUDENT IN AN ORGANIZED HEALTH CARE EDUCATION/TRAINING PROGRAM

## 2023-09-23 PROCEDURE — 96374 THER/PROPH/DIAG INJ IV PUSH: CPT

## 2023-09-23 PROCEDURE — 84484 ASSAY OF TROPONIN QUANT: CPT

## 2023-09-23 PROCEDURE — 94640 AIRWAY INHALATION TREATMENT: CPT

## 2023-09-23 PROCEDURE — 84132 ASSAY OF SERUM POTASSIUM: CPT

## 2023-09-23 PROCEDURE — 80048 BASIC METABOLIC PNL TOTAL CA: CPT

## 2023-09-23 RX ORDER — DIPHENHYDRAMINE HYDROCHLORIDE 50 MG/ML
50 INJECTION INTRAMUSCULAR; INTRAVENOUS
Status: COMPLETED | OUTPATIENT
Start: 2023-09-23 | End: 2023-09-23

## 2023-09-23 RX ORDER — AMLODIPINE BESYLATE 10 MG/1
TABLET ORAL
COMMUNITY
Start: 2023-09-21 | End: 2023-09-24 | Stop reason: SINTOL

## 2023-09-23 RX ADMIN — METHYLPREDNISOLONE SODIUM SUCCINATE 125 MG: 125 INJECTION INTRAMUSCULAR; INTRAVENOUS at 19:09

## 2023-09-23 RX ADMIN — DIPHENHYDRAMINE HYDROCHLORIDE 50 MG: 50 INJECTION INTRAMUSCULAR; INTRAVENOUS at 19:10

## 2023-09-23 RX ADMIN — SODIUM CHLORIDE, PRESERVATIVE FREE 20 MG: 5 INJECTION INTRAVENOUS at 19:10

## 2023-09-23 RX ADMIN — ALBUTEROL SULFATE 10 MG: 2.5 SOLUTION RESPIRATORY (INHALATION) at 20:59

## 2023-09-23 ASSESSMENT — PAIN - FUNCTIONAL ASSESSMENT
PAIN_FUNCTIONAL_ASSESSMENT: 0-10
PAIN_FUNCTIONAL_ASSESSMENT: NONE - DENIES PAIN

## 2023-09-23 ASSESSMENT — PAIN SCALES - GENERAL
PAINLEVEL_OUTOF10: 0
PAINLEVEL_OUTOF10: 0

## 2023-09-23 NOTE — ED TRIAGE NOTES
Pt arrived with complaint of allergic reaction, +tongue face and lip swelling, along with hand swelling,  denies throat feeling like it is closing. Itchy all over and redness , voice changes.   Complaint of strong chest pain

## 2023-09-23 NOTE — ED PROVIDER NOTES
prior to discharge the patient had normal voice, no drooling, and no evidence of obstruction. No reported nausea or vomiting. No other complaints and symptoms have improved. Will discharge home with a diagnosis of allergic reaction with return precautions. I explained that symptoms may recur and I gave return instructions for the patient to return if any new symptoms occur including but not limited to nausea, shortness of breath, tingling in the back of the throat, shortness of breath, or any other new symptoms. Understanding of the plan was ensured prior to discharge. Will discharge home with follow-up and return precautions. It was discussed that the patient is to return to the ER or call with any questions concerning new or worsening symptoms. More in depth discharge instructions regarding follow up and return precautions were given also in the patient's discharge paperwork.    [SS]   0144 Will change amlodipine to HCTZ. [SS]      ED Course User Index  [AA] Marlon Millan MD  [SS] Jessie Do MD       Disposition Considerations (Tests not done, Shared Decision Making, Pt Expectation of Test or Treatment.):  Epinephrine was considered to the patient and patient presentation. However, in the setting of absence of symptoms suggestive of anaphylaxis, X deferred especially in the setting of patient development of chest pain. Patient was given the following medications:  Medications   methylPREDNISolone sodium succ (SOLU-MEDROL) 125 mg in sterile water 2 mL injection (125 mg IntraVENous Given 9/23/23 1909)   diphenhydrAMINE (BENADRYL) injection 50 mg (50 mg IntraVENous Given 9/23/23 1910)   famotidine (PEPCID) 20 mg in sodium chloride (PF) 0.9 % 10 mL injection (20 mg IntraVENous Given 9/23/23 1910)   albuterol (PROVENTIL) nebulizer solution 10 mg (10 mg Nebulization Given 9/23/23 2059)       ED FINAL IMPRESSION     1. Allergic reaction, initial encounter    2. Chest pain, unspecified type    3.

## 2023-09-23 NOTE — ED NOTES
Received care of patient, complaining of facial swelling . No acute distress noted, currently on room air.       Rhea Paniagua RN  09/23/23 6583

## 2023-09-24 VITALS
DIASTOLIC BLOOD PRESSURE: 102 MMHG | RESPIRATION RATE: 19 BRPM | WEIGHT: 280 LBS | SYSTOLIC BLOOD PRESSURE: 155 MMHG | OXYGEN SATURATION: 97 % | HEART RATE: 98 BPM | BODY MASS INDEX: 33.06 KG/M2 | TEMPERATURE: 98 F | HEIGHT: 77 IN

## 2023-09-24 LAB
EKG ATRIAL RATE: 98 BPM
EKG DIAGNOSIS: NORMAL
EKG P AXIS: 49 DEGREES
EKG P-R INTERVAL: 140 MS
EKG Q-T INTERVAL: 346 MS
EKG QRS DURATION: 96 MS
EKG QTC CALCULATION (BAZETT): 441 MS
EKG R AXIS: 27 DEGREES
EKG T AXIS: 44 DEGREES
EKG VENTRICULAR RATE: 98 BPM

## 2023-09-24 PROCEDURE — 99285 EMERGENCY DEPT VISIT HI MDM: CPT

## 2023-09-24 RX ORDER — FAMOTIDINE 20 MG/1
20 TABLET, FILM COATED ORAL 2 TIMES DAILY
Qty: 14 TABLET | Refills: 0 | Status: SHIPPED | OUTPATIENT
Start: 2023-09-24 | End: 2023-10-01

## 2023-09-24 RX ORDER — DIPHENHYDRAMINE HCL 25 MG
25 CAPSULE ORAL EVERY 6 HOURS PRN
Qty: 20 CAPSULE | Refills: 0 | Status: SHIPPED | OUTPATIENT
Start: 2023-09-24

## 2023-09-24 RX ORDER — HYDROCHLOROTHIAZIDE 25 MG/1
25 TABLET ORAL EVERY MORNING
Qty: 30 TABLET | Refills: 0 | Status: SHIPPED | OUTPATIENT
Start: 2023-09-24

## 2023-09-24 RX ORDER — PREDNISONE 20 MG/1
40 TABLET ORAL DAILY
Qty: 6 TABLET | Refills: 0 | Status: SHIPPED | OUTPATIENT
Start: 2023-09-24 | End: 2023-09-27

## 2023-09-24 ASSESSMENT — HEART SCORE: ECG: 0

## 2023-09-24 NOTE — DISCHARGE INSTRUCTIONS
to reach your usual health care provider, you should return to the Emergency Department. We are available 24 hours a day. Please take your discharge instructions with you when you go to your follow-up appointment. If a prescription has been provided, please have it filled as soon as possible to prevent a delay in treatment. Read the entire medication instruction sheet provided to you by the pharmacy. If you have any questions or reservations about taking the medication due to side effects or interactions with other medications, please call your primary care physician or contact the ER to speak with the charge nurse. Make an appointment with your family doctor or the physician you were referred to for follow-up of this visit as instructed on your discharge paperwork, as this is a mandatory follow-up. Return to the ER if you are unable to be seen or if you are unable to be seen in a timely manner. If you have any problem arranging the follow-up visit, contact the Emergency Department immediately.

## 2023-09-24 NOTE — ED NOTES
Bedside shift change report given to Nadia Booker (oncoming nurse) by Dion Matthew (offgoing nurse). Report included the following information Nurse Handoff Report, ED Encounter Summary, and MAR.         [address], RN  09/23/23 2017

## 2025-05-22 ENCOUNTER — APPOINTMENT (OUTPATIENT)
Facility: HOSPITAL | Age: 50
End: 2025-05-22
Payer: COMMERCIAL

## 2025-05-22 ENCOUNTER — HOSPITAL ENCOUNTER (EMERGENCY)
Facility: HOSPITAL | Age: 50
Discharge: HOME | End: 2025-05-22
Attending: EMERGENCY MEDICINE
Payer: COMMERCIAL

## 2025-05-22 VITALS
HEART RATE: 81 BPM | HEIGHT: 77 IN | DIASTOLIC BLOOD PRESSURE: 108 MMHG | RESPIRATION RATE: 20 BRPM | OXYGEN SATURATION: 100 % | WEIGHT: 280 LBS | SYSTOLIC BLOOD PRESSURE: 160 MMHG | BODY MASS INDEX: 33.06 KG/M2 | TEMPERATURE: 98.6 F

## 2025-05-22 DIAGNOSIS — R07.81 RIB PAIN: Primary | ICD-10-CM

## 2025-05-22 DIAGNOSIS — S39.012A BACK STRAIN, INITIAL ENCOUNTER: ICD-10-CM

## 2025-05-22 DIAGNOSIS — T14.8XXA MUSCLE STRAIN: ICD-10-CM

## 2025-05-22 LAB
ANION GAP SERPL CALC-SCNC: 6 MMOL/L (ref 2–12)
BASOPHILS # BLD: 0.03 K/UL (ref 0–0.1)
BASOPHILS NFR BLD: 0.6 % (ref 0–1)
BUN SERPL-MCNC: 14 MG/DL (ref 6–20)
BUN/CREAT SERPL: 14 (ref 12–20)
CA-I BLD-MCNC: 9.7 MG/DL (ref 8.5–10.1)
CHLORIDE SERPL-SCNC: 101 MMOL/L (ref 97–108)
CO2 SERPL-SCNC: 27 MMOL/L (ref 21–32)
CREAT SERPL-MCNC: 1.01 MG/DL (ref 0.7–1.3)
D DIMER PPP FEU-MCNC: <0.27 UG/ML(FEU)
DIFFERENTIAL METHOD BLD: ABNORMAL
EKG ATRIAL RATE: 83 BPM
EKG DIAGNOSIS: NORMAL
EKG P AXIS: 67 DEGREES
EKG P-R INTERVAL: 152 MS
EKG Q-T INTERVAL: 356 MS
EKG QRS DURATION: 96 MS
EKG QTC CALCULATION (BAZETT): 418 MS
EKG R AXIS: 18 DEGREES
EKG T AXIS: 23 DEGREES
EKG VENTRICULAR RATE: 83 BPM
EOSINOPHIL # BLD: 0.08 K/UL (ref 0–0.4)
EOSINOPHIL NFR BLD: 1.6 % (ref 0–7)
ERYTHROCYTE [DISTWIDTH] IN BLOOD BY AUTOMATED COUNT: 13.2 % (ref 11.5–14.5)
GLUCOSE SERPL-MCNC: 270 MG/DL (ref 65–100)
HCT VFR BLD AUTO: 42.6 % (ref 36.6–50.3)
HGB BLD-MCNC: 13.9 G/DL (ref 12.1–17)
IMM GRANULOCYTES # BLD AUTO: 0.03 K/UL (ref 0–0.04)
IMM GRANULOCYTES NFR BLD AUTO: 0.6 % (ref 0–0.5)
LYMPHOCYTES # BLD: 1.91 K/UL (ref 0.8–3.5)
LYMPHOCYTES NFR BLD: 37.4 % (ref 12–49)
MCH RBC QN AUTO: 27.5 PG (ref 26–34)
MCHC RBC AUTO-ENTMCNC: 32.6 G/DL (ref 30–36.5)
MCV RBC AUTO: 84.4 FL (ref 80–99)
MONOCYTES # BLD: 0.29 K/UL (ref 0–1)
MONOCYTES NFR BLD: 5.7 % (ref 5–13)
NEUTS SEG # BLD: 2.77 K/UL (ref 1.8–8)
NEUTS SEG NFR BLD: 54.1 % (ref 32–75)
NRBC # BLD: 0 K/UL (ref 0–0.01)
NRBC BLD-RTO: 0 PER 100 WBC
PLATELET # BLD AUTO: 231 K/UL (ref 150–400)
PMV BLD AUTO: 10 FL (ref 8.9–12.9)
POTASSIUM SERPL-SCNC: 4.7 MMOL/L (ref 3.5–5.1)
RBC # BLD AUTO: 5.05 M/UL (ref 4.1–5.7)
SODIUM SERPL-SCNC: 134 MMOL/L (ref 136–145)
TROPONIN I SERPL HS-MCNC: <4 NG/L (ref 0–76)
WBC # BLD AUTO: 5.1 K/UL (ref 4.1–11.1)

## 2025-05-22 PROCEDURE — 6370000000 HC RX 637 (ALT 250 FOR IP)

## 2025-05-22 PROCEDURE — 80048 BASIC METABOLIC PNL TOTAL CA: CPT

## 2025-05-22 PROCEDURE — 85379 FIBRIN DEGRADATION QUANT: CPT

## 2025-05-22 PROCEDURE — 99285 EMERGENCY DEPT VISIT HI MDM: CPT

## 2025-05-22 PROCEDURE — 85025 COMPLETE CBC W/AUTO DIFF WBC: CPT

## 2025-05-22 PROCEDURE — 6360000002 HC RX W HCPCS

## 2025-05-22 PROCEDURE — 36415 COLL VENOUS BLD VENIPUNCTURE: CPT

## 2025-05-22 PROCEDURE — 96374 THER/PROPH/DIAG INJ IV PUSH: CPT

## 2025-05-22 PROCEDURE — 93005 ELECTROCARDIOGRAM TRACING: CPT | Performed by: EMERGENCY MEDICINE

## 2025-05-22 PROCEDURE — 84484 ASSAY OF TROPONIN QUANT: CPT

## 2025-05-22 PROCEDURE — 71046 X-RAY EXAM CHEST 2 VIEWS: CPT

## 2025-05-22 RX ORDER — LIDOCAINE 4 G/G
1 PATCH TOPICAL
Status: DISCONTINUED | OUTPATIENT
Start: 2025-05-22 | End: 2025-05-22 | Stop reason: HOSPADM

## 2025-05-22 RX ORDER — METHOCARBAMOL 750 MG/1
1500 TABLET, FILM COATED ORAL ONCE
Status: COMPLETED | OUTPATIENT
Start: 2025-05-22 | End: 2025-05-22

## 2025-05-22 RX ORDER — METHOCARBAMOL 750 MG/1
750 TABLET, FILM COATED ORAL 3 TIMES DAILY
Qty: 15 TABLET | Refills: 0 | Status: SHIPPED | OUTPATIENT
Start: 2025-05-22 | End: 2025-05-27

## 2025-05-22 RX ORDER — LIDOCAINE 4 G/G
1 PATCH TOPICAL DAILY
Qty: 20 EACH | Refills: 0 | Status: SHIPPED | OUTPATIENT
Start: 2025-05-22 | End: 2025-06-11

## 2025-05-22 RX ORDER — KETOROLAC TROMETHAMINE 30 MG/ML
30 INJECTION, SOLUTION INTRAMUSCULAR; INTRAVENOUS ONCE
Status: COMPLETED | OUTPATIENT
Start: 2025-05-22 | End: 2025-05-22

## 2025-05-22 RX ORDER — KETOROLAC TROMETHAMINE 10 MG/1
10 TABLET, FILM COATED ORAL EVERY 6 HOURS PRN
Qty: 20 TABLET | Refills: 0 | Status: SHIPPED | OUTPATIENT
Start: 2025-05-22

## 2025-05-22 RX ORDER — KETOROLAC TROMETHAMINE 30 MG/ML
30 INJECTION, SOLUTION INTRAMUSCULAR; INTRAVENOUS
Status: DISCONTINUED | OUTPATIENT
Start: 2025-05-22 | End: 2025-05-22

## 2025-05-22 RX ADMIN — METHOCARBAMOL 1500 MG: 750 TABLET ORAL at 08:50

## 2025-05-22 RX ADMIN — KETOROLAC TROMETHAMINE 30 MG: 30 INJECTION, SOLUTION INTRAMUSCULAR at 08:50

## 2025-05-22 ASSESSMENT — PAIN SCALES - GENERAL
PAINLEVEL_OUTOF10: 10
PAINLEVEL_OUTOF10: 5

## 2025-05-22 ASSESSMENT — PAIN - FUNCTIONAL ASSESSMENT: PAIN_FUNCTIONAL_ASSESSMENT: 0-10

## 2025-05-22 ASSESSMENT — PAIN DESCRIPTION - LOCATION: LOCATION: BACK

## 2025-05-22 ASSESSMENT — LIFESTYLE VARIABLES
HOW OFTEN DO YOU HAVE A DRINK CONTAINING ALCOHOL: NEVER
HOW MANY STANDARD DRINKS CONTAINING ALCOHOL DO YOU HAVE ON A TYPICAL DAY: PATIENT DOES NOT DRINK

## 2025-05-22 ASSESSMENT — HEART SCORE: ECG: NORMAL

## 2025-05-22 NOTE — DISCHARGE INSTRUCTIONS
Thank you!  Thank you for allowing me to care for you in the emergency department. It is my goal to provide you with excellent care. If you have not received excellent quality care, please ask to speak to the nurse manager. Please fill out the survey that will come to you by mail or email since we listen to your feedback!     Below you will find a list of your tests from today's visit.  Should you have any questions, please do not hesitate to call the emergency department.    Labs  Recent Results (from the past 12 hours)   CBC with Auto Differential    Collection Time: 05/22/25  8:54 AM   Result Value Ref Range    WBC 5.1 4.1 - 11.1 K/uL    RBC 5.05 4.10 - 5.70 M/uL    Hemoglobin 13.9 12.1 - 17.0 g/dL    Hematocrit 42.6 36.6 - 50.3 %    MCV 84.4 80.0 - 99.0 FL    MCH 27.5 26.0 - 34.0 PG    MCHC 32.6 30.0 - 36.5 g/dL    RDW 13.2 11.5 - 14.5 %    Platelets 231 150 - 400 K/uL    MPV 10.0 8.9 - 12.9 FL    Nucleated RBCs 0.0 0.0  WBC    nRBC 0.00 0.00 - 0.01 K/uL    Neutrophils % 54.1 32.0 - 75.0 %    Lymphocytes % 37.4 12.0 - 49.0 %    Monocytes % 5.7 5.0 - 13.0 %    Eosinophils % 1.6 0.0 - 7.0 %    Basophils % 0.6 0.0 - 1.0 %    Immature Granulocytes % 0.6 (H) 0 - 0.5 %    Neutrophils Absolute 2.77 1.80 - 8.00 K/UL    Lymphocytes Absolute 1.91 0.80 - 3.50 K/UL    Monocytes Absolute 0.29 0.00 - 1.00 K/UL    Eosinophils Absolute 0.08 0.00 - 0.40 K/UL    Basophils Absolute 0.03 0.00 - 0.10 K/UL    Immature Granulocytes Absolute 0.03 0.00 - 0.04 K/UL    Differential Type AUTOMATED     BMP    Collection Time: 05/22/25  8:54 AM   Result Value Ref Range    Sodium 134 (L) 136 - 145 mmol/L    Potassium 4.7 3.5 - 5.1 mmol/L    Chloride 101 97 - 108 mmol/L    CO2 27 21 - 32 mmol/L    Anion Gap 6 2 - 12 mmol/L    Glucose 270 (H) 65 - 100 mg/dL    BUN 14 6 - 20 mg/dL    Creatinine 1.01 0.70 - 1.30 mg/dL    BUN/Creatinine Ratio 14 12 - 20      Est, Glom Filt Rate >90 >60 ml/min/1.73m2    Calcium 9.7 8.5 - 10.1 mg/dL

## 2025-05-22 NOTE — ED PROVIDER NOTES
[facility] EMERGENCY DEPARTMENT  EMERGENCY DEPARTMENT HISTORY AND PHYSICAL EXAM      Date: 5/22/2025  Patient Name: Tahir Ashby  MRN: [id number]  Birthdate 1975  Date of evaluation: 5/22/2025  Provider: Adriana Nevarez PA-C   Note Started: 8:21 AM EDT 5/22/25    HISTORY OF PRESENT ILLNESS     Chief Complaint   Patient presents with    Rib Pain (injury)    Back Pain       History Provided By: Patient    HPI: Tahir Ashby is a 49 y.o. male with past medical history listed below, presents for evaluation of left sided lower rib pain.  Patient states symptoms started about 2 days ago, noticed it when he was at work, slowly worsening.  He denies any specific injury that he is aware of.  States this feels similar to a pulled muscle when he had it previously. Denies any shortness of breath, difficulty breathing, nausea/vomiting, constipation/diarrhea, abdominal pain, rash, night sweats, or chest pain.     PAST MEDICAL HISTORY   Past Medical History:  Past Medical History:   Diagnosis Date    Diabetes mellitus (HCC)     Hypertension        Past Surgical History:  Past Surgical History:   Procedure Laterality Date    OTHER SURGICAL HISTORY      hernia        Family History:  History reviewed. No pertinent family history.    Social History:  Social History     Tobacco Use    Smoking status: Never    Smokeless tobacco: Never   Vaping Use    Vaping status: Never Used   Substance Use Topics    Alcohol use: Not Currently    Drug use: No       Allergies:  Allergies   Allergen Reactions    Amlodipine Anaphylaxis    Amoxicillin Rash and Hives     Other reaction(s): rash       PCP: Sree La DO    Current Meds:   No current facility-administered medications for this encounter.     Current Outpatient Medications   Medication Sig Dispense Refill    ketorolac (TORADOL) 10 MG tablet Take 1 tablet by mouth every 6 hours as needed for Pain 20 tablet 0    lidocaine 4 % external patch Place 1 patch onto the  symptoms worsen.      PATIENT REFERRED TO:  Sree La   [address] [address] [phone]              DISCHARGE MEDICATIONS:     Medication List        START taking these medications      lidocaine 4 % external patch  Place 1 patch onto the skin daily for 20 days     methocarbamol 750 MG tablet  Commonly known as: Robaxin-750  Take 1 tablet by mouth 3 times daily for 5 days            CHANGE how you take these medications      * ketorolac 10 MG tablet  Commonly known as: TORADOL  Take 1 tablet by mouth every 6 hours as needed for Pain  What changed: Another medication with the same name was added. Make sure you understand how and when to take each.     * ketorolac 10 MG tablet  Commonly known as: TORADOL  Take 1 tablet by mouth every 6 hours as needed for Pain  What changed: You were already taking a medication with the same name, and this prescription was added. Make sure you understand how and when to take each.           * This list has 2 medication(s) that are the same as other medications prescribed for you. Read the directions carefully, and ask your doctor or other care provider to review them with you.                ASK your doctor about these medications      diphenhydrAMINE 25 MG capsule  Commonly known as: Benadryl Allergy  Take 1 capsule by mouth every 6 hours as needed for Allergies     famotidine 20 MG tablet  Commonly known as: Pepcid  Take 1 tablet by mouth 2 times daily for 7 days     hydroCHLOROthiazide 25 MG tablet  Commonly known as: HYDRODIURIL  Take 1 tablet by mouth every morning     ondansetron 4 MG disintegrating tablet  Commonly known as: ZOFRAN-ODT               Where to Get Your Medications        These medications were sent to [place], [place] - [address] [place] [address] [phone] - F [phone]  [address] [facility] [address]      Phone: [phone]   ketorolac 10 MG tablet  lidocaine 4 % external

## 2025-05-22 NOTE — ED TRIAGE NOTES
Pt reports r sided rib pain that radiates into his back, denies any heavy lifting. Pt reports pain with inspiration.